# Patient Record
Sex: FEMALE | NOT HISPANIC OR LATINO | Employment: FULL TIME | URBAN - METROPOLITAN AREA
[De-identification: names, ages, dates, MRNs, and addresses within clinical notes are randomized per-mention and may not be internally consistent; named-entity substitution may affect disease eponyms.]

---

## 2021-07-26 ENCOUNTER — NURSE TRIAGE (OUTPATIENT)
Dept: NURSING | Facility: CLINIC | Age: 31
End: 2021-07-26

## 2021-07-26 DIAGNOSIS — Z11.59 ENCOUNTER FOR SCREENING FOR OTHER VIRAL DISEASES: Primary | ICD-10-CM

## 2021-07-26 NOTE — TELEPHONE ENCOUNTER
"Called earlier. Do they do antibody tests? She's traveling next month so needs the test.  Patient is calling requesting COVID serologic antibody testing.  NOTE: Serologic testing is a blood test for 'antibodies' which are made at 10-14 days after you have had symptoms of COVID or were exposed and had an asymptomatic infection.  This does NOT test you for 'active' infection or tell you if you are contagious.    Are you a healthcare worker? yes  Do you currently have a cough, fever, body aches, shortness of breath, or difficulty breathing?  No  Did you previously have cough, fever, body aches, shortness of breath, or difficulty breathing that have now resolved? No previous covid symptoms.   Have you been exposed to (or come into close contact with) someone who tested POSITIVE for COVID-19 or someone who had a possible case of COVID-19?  No Exposure. Per SARS-CoV-2 Serology test standing order, placed \"COVID-19 [facility] and [facility]iter\" lab order (found in the COVID-19 (Coronavirus) Antibody by Serology Panel (FUTURE) with associated diagnosis code \"Encounter for Screening for other viral disease\" (Z11.59)\"      The patient was informed: \"Testing is limited each day and it may take time for testing to be available to everyone who has called. You will receive a call within 48-72 hours to schedule the serology testing. Please confirm the best number to reach you is [phone]. If you have any questions about scheduling, call [phone].\"     Trudy Aleman RN  [facility]    "

## 2021-08-02 ENCOUNTER — APPOINTMENT (OUTPATIENT)
Dept: LAB | Facility: CLINIC | Age: 31
End: 2021-08-02
Payer: COMMERCIAL

## 2021-08-02 ENCOUNTER — TELEPHONE (OUTPATIENT)
Dept: FAMILY MEDICINE | Facility: CLINIC | Age: 31
End: 2021-08-02

## 2021-08-02 DIAGNOSIS — Z11.59 ENCOUNTER FOR SCREENING FOR OTHER VIRAL DISEASES: ICD-10-CM

## 2021-08-02 NOTE — PROGRESS NOTES
Patient is wanting this lab result to stay within the [facility] and not to go to [facility] so her employer [employer] can see the results.

## 2021-08-02 NOTE — TELEPHONE ENCOUNTER
I don't know how to limit care everywhere this way. I think the patient scheduling team know more about the care everywhere consent people sign and how they can unsign it if already signed. I would recommend contacting the  supervising team or [facility]    I would say her employer can't just choose to access this information but her provider teams if they are at [facility] could if she has appointment there.     Heriberto Hernandez MD

## 2021-08-02 NOTE — TELEPHONE ENCOUNTER
Pt would like to maintain privacy and not use care everywhere please write letter to pt updating her of her results and then mail to her. So she can show her work. You can check previous encounter documentation from 8/2. Routing to ordering provider since pt is not affiliated with  or our clinic. The Covid test results to clarify. Thank you so much.   Meir Bonilla

## 2021-08-03 PROCEDURE — 99000 SPECIMEN HANDLING OFFICE-LAB: CPT

## 2021-08-03 PROCEDURE — 36415 COLL VENOUS BLD VENIPUNCTURE: CPT

## 2021-08-03 PROCEDURE — 86769 SARS-COV-2 COVID-19 ANTIBODY: CPT | Mod: 90

## 2021-08-04 NOTE — TELEPHONE ENCOUNTER
Once letter results call patient to either  letter or mail. If is positive. She is leaving next week. [phone] and okay to leave detailed message.   Meri Bonilla

## 2021-08-05 LAB
SARS-COV-2 AB SERPL IA-ACNC: 146 U/ML
SARS-COV-2 AB SERPL QL IA: POSITIVE

## 2021-08-06 NOTE — TELEPHONE ENCOUNTER
Sending to ordering Physician to complete letter and route back to me so I can have it printed off and up front for patient since she leaves country next Wednesday. Thank you,   Meri Bonilla

## 2021-08-09 NOTE — TELEPHONE ENCOUNTER
My understanding from the message below from Dr. Hernandez is to take her results and our standard result letter and ask her PCP clinic ([facility]) to see if they will write the more specific letter she is looking for. Jennifer Henley RN

## 2021-08-09 NOTE — TELEPHONE ENCOUNTER
Patient calling back to ask that Dr. Hernandez to write a letter with the following points.     1) Sharmin Dillard tested positive for COVID 19 antibodies.  2) She does not have a COVID 19 infection at this time.  3) she is cleared for travel.     Dr. Hernandez to sign electronically. Patient requests to  at the clinic . Patient also requests hard copy of lab results. States that she is unable to print from home.     Needs letter by Wednesday in order to travel.     Can we leave a detailed message on this number? YES  Phone number patient can be reached at: Cell number on file:    Telephone Information:   Mobile [phone]     Jennifer Henley RN  [facility] Triage      Jennifer Henley RN

## 2021-08-09 NOTE — TELEPHONE ENCOUNTER
I am not sure what the patient wants for a letter. There is a results letter in the chart from the RN results team who creates all these letter for covid testing and covid antibody testing. You can provide the patient that. letter If she need anything more specific, she should connect with her clinic and bring these results for them to reference. I am listed as the ordering provider in this standing order but have not seen these patients. These antibody tests are done as a patient directed test. Heriberto Hernandez MD      The above is what David sent to me . I do not know how to proceed.   Meri Bonilla

## 2021-08-10 NOTE — TELEPHONE ENCOUNTER
Spoke with Pt and left  Lab results up front for her to . She will turn on care everywhere to see if a provider at her [facility] clinic will work with her. She said lab result printed off alone should be fine.   Thank you ,   Meri Bonilla

## 2021-08-10 NOTE — TELEPHONE ENCOUNTER
"Spoke with Mikie he recommended as another route to have PCP at outside clinic fill out letter and use care everywhere the problem is pt in lab request before this stated to a one of [facility] flex staff \"Patient is wanting this lab result to stay within the [facility] and not to go to [facility] so her employer [employer] can see the results.\"  Please advise.   Meri Bonilla       "

## 2021-08-10 NOTE — TELEPHONE ENCOUNTER
Pt does not have a PCP . I have detailed below what she is asking for in this letter. Patient leaves the country tomorrow. I have been trying to get her a letter for a couple days. Please advise. EP providers are asking ordering provider write this letter. Routing to Provider of the week and order Provider to advise. Thank you very much.   Meri Bonilla       Patient calling back to ask that Dr. Hernandez to write a letter with the following points.      1) Sharmin Dillard tested positive for COVID 19 antibodies.  2) She does not have a COVID 19 infection at this time.  3) she is cleared for travel.      Dr. Hernandez to sign electronically. Patient requests to  at the clinic . Patient also requests hard copy of lab results. States that she is unable to print from home.      Needs letter by Wednesday in order to travel.      Can we leave a detailed message on this number? YES  Phone number patient can be reached at: Cell number on file:        Telephone Information:   Mobile [phone]      Jennifer Henley, RN  [facility] Triage

## 2021-08-10 NOTE — TELEPHONE ENCOUNTER
Dr. Deluna since you are provider of the week would you be able to assist. . I can write the letter or have one of the MA's assist? Would you be able to sign off on this?  Meri Bonilla

## 2021-08-22 ENCOUNTER — HEALTH MAINTENANCE LETTER (OUTPATIENT)
Age: 31
End: 2021-08-22

## 2021-10-17 ENCOUNTER — HEALTH MAINTENANCE LETTER (OUTPATIENT)
Age: 31
End: 2021-10-17

## 2022-10-03 ENCOUNTER — HEALTH MAINTENANCE LETTER (OUTPATIENT)
Age: 32
End: 2022-10-03

## 2023-10-21 ENCOUNTER — HEALTH MAINTENANCE LETTER (OUTPATIENT)
Age: 33
End: 2023-10-21